# Patient Record
Sex: MALE | Race: WHITE | NOT HISPANIC OR LATINO | Employment: OTHER | URBAN - METROPOLITAN AREA
[De-identification: names, ages, dates, MRNs, and addresses within clinical notes are randomized per-mention and may not be internally consistent; named-entity substitution may affect disease eponyms.]

---

## 2017-05-10 ENCOUNTER — OFFICE VISIT (OUTPATIENT)
Dept: ORTHOPEDIC SURGERY | Facility: CLINIC | Age: 57
End: 2017-05-10

## 2017-05-10 VITALS
BODY MASS INDEX: 29.18 KG/M2 | DIASTOLIC BLOOD PRESSURE: 88 MMHG | HEIGHT: 69 IN | HEART RATE: 85 BPM | WEIGHT: 197 LBS | SYSTOLIC BLOOD PRESSURE: 136 MMHG

## 2017-05-10 DIAGNOSIS — S82.261D CLOSED DISPLACED SEGMENTAL FRACTURE OF SHAFT OF RIGHT TIBIA WITH ROUTINE HEALING, SUBSEQUENT ENCOUNTER: ICD-10-CM

## 2017-05-10 DIAGNOSIS — M75.102 TEAR OF LEFT ROTATOR CUFF, UNSPECIFIED TEAR EXTENT: ICD-10-CM

## 2017-05-10 DIAGNOSIS — Z09 STATUS POST ORTHOPEDIC SURGERY, FOLLOW-UP EXAM: ICD-10-CM

## 2017-05-10 DIAGNOSIS — R52 PAIN: Primary | ICD-10-CM

## 2017-05-10 PROCEDURE — 73590 X-RAY EXAM OF LOWER LEG: CPT | Performed by: ORTHOPAEDIC SURGERY

## 2017-05-10 PROCEDURE — 73030 X-RAY EXAM OF SHOULDER: CPT | Performed by: ORTHOPAEDIC SURGERY

## 2017-05-10 PROCEDURE — 99214 OFFICE O/P EST MOD 30 MIN: CPT | Performed by: ORTHOPAEDIC SURGERY

## 2018-10-29 ENCOUNTER — APPOINTMENT (OUTPATIENT)
Dept: ULTRASOUND IMAGING | Facility: HOSPITAL | Age: 58
End: 2018-10-29

## 2018-10-29 ENCOUNTER — HOSPITAL ENCOUNTER (EMERGENCY)
Facility: HOSPITAL | Age: 58
Discharge: HOME | End: 2018-10-29
Attending: EMERGENCY MEDICINE | Admitting: EMERGENCY MEDICINE

## 2018-10-29 ENCOUNTER — APPOINTMENT (OUTPATIENT)
Dept: CT IMAGING | Facility: HOSPITAL | Age: 58
End: 2018-10-29
Attending: EMERGENCY MEDICINE

## 2018-10-29 VITALS
HEART RATE: 80 BPM | SYSTOLIC BLOOD PRESSURE: 127 MMHG | DIASTOLIC BLOOD PRESSURE: 92 MMHG | HEIGHT: 69 IN | OXYGEN SATURATION: 97 % | RESPIRATION RATE: 18 BRPM | WEIGHT: 202 LBS | TEMPERATURE: 98 F | BODY MASS INDEX: 29.92 KG/M2

## 2018-10-29 DIAGNOSIS — N20.0 KIDNEY STONE ON LEFT SIDE: Primary | ICD-10-CM

## 2018-10-29 DIAGNOSIS — N50.3 EPIDIDYMAL CYST: ICD-10-CM

## 2018-10-29 DIAGNOSIS — E87.6 HYPOKALEMIA: ICD-10-CM

## 2018-10-29 LAB
ALBUMIN SERPL-MCNC: 4.1 G/DL (ref 3.5–5.2)
ALBUMIN/GLOB SERPL: 1.5 G/DL
ALP SERPL-CCNC: 73 U/L (ref 40–129)
ALT SERPL W P-5'-P-CCNC: 15 U/L (ref 5–41)
AMORPH URATE CRY URNS QL MICRO: ABNORMAL /HPF
ANION GAP SERPL CALCULATED.3IONS-SCNC: 15.3 MMOL/L
AST SERPL-CCNC: 15 U/L (ref 5–40)
BACTERIA UR QL AUTO: ABNORMAL /HPF
BASOPHILS # BLD AUTO: 0.05 10*3/MM3 (ref 0–0.2)
BASOPHILS NFR BLD AUTO: 0.9 % (ref 0–2)
BILIRUB SERPL-MCNC: 0.3 MG/DL (ref 0.2–1.2)
BILIRUB UR QL STRIP: NEGATIVE
BUN BLD-MCNC: 12 MG/DL (ref 6–20)
BUN/CREAT SERPL: 12.4 (ref 7–25)
CALCIUM SPEC-SCNC: 8.8 MG/DL (ref 8.6–10.5)
CHLORIDE SERPL-SCNC: 103 MMOL/L (ref 98–107)
CLARITY UR: ABNORMAL
CO2 SERPL-SCNC: 22.7 MMOL/L (ref 22–29)
COLOR UR: YELLOW
CREAT BLD-MCNC: 0.97 MG/DL (ref 0.76–1.27)
DEPRECATED RDW RBC AUTO: 39.8 FL (ref 37–54)
EOSINOPHIL # BLD AUTO: 0.11 10*3/MM3 (ref 0.1–0.3)
EOSINOPHIL NFR BLD AUTO: 1.9 % (ref 0–4)
ERYTHROCYTE [DISTWIDTH] IN BLOOD BY AUTOMATED COUNT: 12.1 % (ref 11.5–14.5)
GFR SERPL CREATININE-BSD FRML MDRD: 80 ML/MIN/1.73
GLOBULIN UR ELPH-MCNC: 2.8 GM/DL
GLUCOSE BLD-MCNC: 124 MG/DL (ref 65–99)
GLUCOSE UR STRIP-MCNC: NEGATIVE MG/DL
HCT VFR BLD AUTO: 45.5 % (ref 42–52)
HGB BLD-MCNC: 15.7 G/DL (ref 14–18)
HGB UR QL STRIP.AUTO: ABNORMAL
HOLD SPECIMEN: NORMAL
HOLD SPECIMEN: NORMAL
HYALINE CASTS UR QL AUTO: ABNORMAL /LPF
IMM GRANULOCYTES # BLD: 0.01 10*3/MM3 (ref 0–0.03)
IMM GRANULOCYTES NFR BLD: 0.2 % (ref 0–0.5)
KETONES UR QL STRIP: NEGATIVE
LEUKOCYTE ESTERASE UR QL STRIP.AUTO: NEGATIVE
LIPASE SERPL-CCNC: 39 U/L (ref 13–60)
LYMPHOCYTES # BLD AUTO: 2.04 10*3/MM3 (ref 0.6–4.8)
LYMPHOCYTES NFR BLD AUTO: 35.2 % (ref 20–45)
MCH RBC QN AUTO: 31.4 PG (ref 27–31)
MCHC RBC AUTO-ENTMCNC: 34.5 G/DL (ref 31–37)
MCV RBC AUTO: 91 FL (ref 80–94)
MONOCYTES # BLD AUTO: 0.54 10*3/MM3 (ref 0–1)
MONOCYTES NFR BLD AUTO: 9.3 % (ref 3–8)
NEUTROPHILS # BLD AUTO: 3.04 10*3/MM3 (ref 1.5–8.3)
NEUTROPHILS NFR BLD AUTO: 52.5 % (ref 45–70)
NITRITE UR QL STRIP: NEGATIVE
NRBC BLD MANUAL-RTO: 0 /100 WBC (ref 0–0)
PH UR STRIP.AUTO: >=9 [PH] (ref 4.5–8)
PLATELET # BLD AUTO: 166 10*3/MM3 (ref 140–500)
PMV BLD AUTO: 10.6 FL (ref 7.4–10.4)
POTASSIUM BLD-SCNC: 3.2 MMOL/L (ref 3.5–5.2)
PROT SERPL-MCNC: 6.9 G/DL (ref 6–8.5)
PROT UR QL STRIP: NEGATIVE
RBC # BLD AUTO: 5 10*6/MM3 (ref 4.7–6.1)
RBC # UR: ABNORMAL /HPF
REF LAB TEST METHOD: ABNORMAL
SODIUM BLD-SCNC: 141 MMOL/L (ref 136–145)
SP GR UR STRIP: 1.01 (ref 1–1.03)
SQUAMOUS #/AREA URNS HPF: ABNORMAL /HPF
UROBILINOGEN UR QL STRIP: ABNORMAL
WBC NRBC COR # BLD: 5.79 10*3/MM3 (ref 4.8–10.8)
WBC UR QL AUTO: ABNORMAL /HPF
WHOLE BLOOD HOLD SPECIMEN: NORMAL
WHOLE BLOOD HOLD SPECIMEN: NORMAL

## 2018-10-29 PROCEDURE — 93976 VASCULAR STUDY: CPT

## 2018-10-29 PROCEDURE — 99284 EMERGENCY DEPT VISIT MOD MDM: CPT

## 2018-10-29 PROCEDURE — 76870 US EXAM SCROTUM: CPT

## 2018-10-29 PROCEDURE — 96374 THER/PROPH/DIAG INJ IV PUSH: CPT

## 2018-10-29 PROCEDURE — 25010000002 ONDANSETRON PER 1 MG: Performed by: EMERGENCY MEDICINE

## 2018-10-29 PROCEDURE — 25010000002 KETOROLAC TROMETHAMINE PER 15 MG: Performed by: EMERGENCY MEDICINE

## 2018-10-29 PROCEDURE — 36415 COLL VENOUS BLD VENIPUNCTURE: CPT

## 2018-10-29 PROCEDURE — 81001 URINALYSIS AUTO W/SCOPE: CPT | Performed by: EMERGENCY MEDICINE

## 2018-10-29 PROCEDURE — 83690 ASSAY OF LIPASE: CPT | Performed by: EMERGENCY MEDICINE

## 2018-10-29 PROCEDURE — 85025 COMPLETE CBC W/AUTO DIFF WBC: CPT | Performed by: EMERGENCY MEDICINE

## 2018-10-29 PROCEDURE — 99284 EMERGENCY DEPT VISIT MOD MDM: CPT | Performed by: EMERGENCY MEDICINE

## 2018-10-29 PROCEDURE — 25010000002 MORPHINE PER 10 MG: Performed by: EMERGENCY MEDICINE

## 2018-10-29 PROCEDURE — 74176 CT ABD & PELVIS W/O CONTRAST: CPT

## 2018-10-29 PROCEDURE — 96375 TX/PRO/DX INJ NEW DRUG ADDON: CPT

## 2018-10-29 PROCEDURE — 80053 COMPREHEN METABOLIC PANEL: CPT | Performed by: EMERGENCY MEDICINE

## 2018-10-29 RX ORDER — OXYCODONE HYDROCHLORIDE AND ACETAMINOPHEN 5; 325 MG/1; MG/1
1 TABLET ORAL EVERY 6 HOURS PRN
Qty: 20 TABLET | Refills: 0 | OUTPATIENT
Start: 2018-10-29 | End: 2019-01-17

## 2018-10-29 RX ORDER — LOSARTAN POTASSIUM AND HYDROCHLOROTHIAZIDE 12.5; 5 MG/1; MG/1
1 TABLET ORAL DAILY
COMMUNITY

## 2018-10-29 RX ORDER — ONDANSETRON 4 MG/1
4 TABLET, ORALLY DISINTEGRATING ORAL EVERY 6 HOURS PRN
Qty: 20 TABLET | Refills: 0 | OUTPATIENT
Start: 2018-10-29 | End: 2019-01-17

## 2018-10-29 RX ORDER — IBUPROFEN 200 MG
200 TABLET ORAL EVERY 6 HOURS PRN
COMMUNITY
End: 2019-01-17

## 2018-10-29 RX ORDER — KETOROLAC TROMETHAMINE 30 MG/ML
30 INJECTION, SOLUTION INTRAMUSCULAR; INTRAVENOUS ONCE
Status: COMPLETED | OUTPATIENT
Start: 2018-10-29 | End: 2018-10-29

## 2018-10-29 RX ORDER — SERTRALINE HYDROCHLORIDE 100 MG/1
100 TABLET, FILM COATED ORAL DAILY
COMMUNITY
End: 2019-01-17

## 2018-10-29 RX ORDER — OMEPRAZOLE 20 MG/1
20 CAPSULE, DELAYED RELEASE ORAL DAILY
COMMUNITY
End: 2019-01-17

## 2018-10-29 RX ORDER — SODIUM CHLORIDE 0.9 % (FLUSH) 0.9 %
10 SYRINGE (ML) INJECTION AS NEEDED
Status: DISCONTINUED | OUTPATIENT
Start: 2018-10-29 | End: 2018-10-29 | Stop reason: HOSPADM

## 2018-10-29 RX ORDER — CARBAMAZEPINE 200 MG/1
200 TABLET ORAL 2 TIMES DAILY
COMMUNITY

## 2018-10-29 RX ORDER — ONDANSETRON 2 MG/ML
4 INJECTION INTRAMUSCULAR; INTRAVENOUS ONCE
Status: COMPLETED | OUTPATIENT
Start: 2018-10-29 | End: 2018-10-29

## 2018-10-29 RX ORDER — POTASSIUM CHLORIDE 20 MEQ/1
20 TABLET, EXTENDED RELEASE ORAL DAILY
Qty: 7 TABLET | Refills: 0 | Status: SHIPPED | OUTPATIENT
Start: 2018-10-29 | End: 2018-11-05

## 2018-10-29 RX ADMIN — Medication 10 ML: at 11:20

## 2018-10-29 RX ADMIN — MORPHINE SULFATE 4 MG: 4 INJECTION INTRAVENOUS at 11:48

## 2018-10-29 RX ADMIN — KETOROLAC TROMETHAMINE 30 MG: 30 INJECTION, SOLUTION INTRAMUSCULAR at 11:48

## 2018-10-29 RX ADMIN — ONDANSETRON 4 MG: 2 INJECTION, SOLUTION INTRAMUSCULAR; INTRAVENOUS at 11:48

## 2019-01-17 ENCOUNTER — HOSPITAL ENCOUNTER (EMERGENCY)
Facility: HOSPITAL | Age: 59
Discharge: HOME | End: 2019-01-17
Attending: EMERGENCY MEDICINE | Admitting: EMERGENCY MEDICINE

## 2019-01-17 ENCOUNTER — APPOINTMENT (OUTPATIENT)
Dept: CT IMAGING | Facility: HOSPITAL | Age: 59
End: 2019-01-17

## 2019-01-17 VITALS
BODY MASS INDEX: 29.33 KG/M2 | SYSTOLIC BLOOD PRESSURE: 125 MMHG | HEIGHT: 69 IN | RESPIRATION RATE: 16 BRPM | OXYGEN SATURATION: 94 % | DIASTOLIC BLOOD PRESSURE: 96 MMHG | TEMPERATURE: 97 F | HEART RATE: 71 BPM | WEIGHT: 198 LBS

## 2019-01-17 DIAGNOSIS — R10.9 ACUTE RIGHT FLANK PAIN: Primary | ICD-10-CM

## 2019-01-17 DIAGNOSIS — N20.1 RIGHT URETERAL STONE: ICD-10-CM

## 2019-01-17 LAB
ALBUMIN SERPL-MCNC: 4.5 G/DL (ref 3.5–5.2)
ALBUMIN/GLOB SERPL: 1.3 G/DL
ALP SERPL-CCNC: 76 U/L (ref 40–129)
ALT SERPL W P-5'-P-CCNC: 13 U/L (ref 5–41)
ANION GAP SERPL CALCULATED.3IONS-SCNC: 13.1 MMOL/L
AST SERPL-CCNC: 15 U/L (ref 5–40)
BACTERIA UR QL AUTO: ABNORMAL /HPF
BASOPHILS # BLD AUTO: 0.08 10*3/MM3 (ref 0–0.2)
BASOPHILS NFR BLD AUTO: 1 % (ref 0–2)
BILIRUB SERPL-MCNC: 0.4 MG/DL (ref 0.2–1.2)
BILIRUB UR QL STRIP: NEGATIVE
BUN BLD-MCNC: 19 MG/DL (ref 6–20)
BUN/CREAT SERPL: 19.2 (ref 7–25)
CALCIUM SPEC-SCNC: 9.3 MG/DL (ref 8.6–10.5)
CHLORIDE SERPL-SCNC: 105 MMOL/L (ref 98–107)
CLARITY UR: CLEAR
CO2 SERPL-SCNC: 22.9 MMOL/L (ref 22–29)
COLOR UR: YELLOW
CREAT BLD-MCNC: 0.99 MG/DL (ref 0.76–1.27)
DEPRECATED RDW RBC AUTO: 41.4 FL (ref 37–54)
EOSINOPHIL # BLD AUTO: 0.13 10*3/MM3 (ref 0.1–0.3)
EOSINOPHIL NFR BLD AUTO: 1.6 % (ref 0–4)
ERYTHROCYTE [DISTWIDTH] IN BLOOD BY AUTOMATED COUNT: 12.4 % (ref 11.5–14.5)
GFR SERPL CREATININE-BSD FRML MDRD: 78 ML/MIN/1.73
GLOBULIN UR ELPH-MCNC: 3.4 GM/DL
GLUCOSE BLD-MCNC: 136 MG/DL (ref 65–99)
GLUCOSE UR STRIP-MCNC: NEGATIVE MG/DL
HCT VFR BLD AUTO: 45.1 % (ref 42–52)
HGB BLD-MCNC: 15.7 G/DL (ref 14–18)
HGB UR QL STRIP.AUTO: ABNORMAL
HYALINE CASTS UR QL AUTO: ABNORMAL /LPF
IMM GRANULOCYTES # BLD AUTO: 0.02 10*3/MM3 (ref 0–0.03)
IMM GRANULOCYTES NFR BLD AUTO: 0.2 % (ref 0–0.5)
KETONES UR QL STRIP: NEGATIVE
LEUKOCYTE ESTERASE UR QL STRIP.AUTO: NEGATIVE
LYMPHOCYTES # BLD AUTO: 2.84 10*3/MM3 (ref 0.6–4.8)
LYMPHOCYTES NFR BLD AUTO: 33.9 % (ref 20–45)
MCH RBC QN AUTO: 31.8 PG (ref 27–31)
MCHC RBC AUTO-ENTMCNC: 34.8 G/DL (ref 31–37)
MCV RBC AUTO: 91.5 FL (ref 80–94)
MONOCYTES # BLD AUTO: 0.85 10*3/MM3 (ref 0–1)
MONOCYTES NFR BLD AUTO: 10.2 % (ref 3–8)
NEUTROPHILS # BLD AUTO: 4.45 10*3/MM3 (ref 1.5–8.3)
NEUTROPHILS NFR BLD AUTO: 53.1 % (ref 45–70)
NITRITE UR QL STRIP: NEGATIVE
NRBC BLD AUTO-RTO: 0 /100 WBC (ref 0–0)
PH UR STRIP.AUTO: 7.5 [PH] (ref 4.5–8)
PLATELET # BLD AUTO: 176 10*3/MM3 (ref 140–500)
PMV BLD AUTO: 11.5 FL (ref 7.4–10.4)
POTASSIUM BLD-SCNC: 3.2 MMOL/L (ref 3.5–5.2)
PROT SERPL-MCNC: 7.9 G/DL (ref 6–8.5)
PROT UR QL STRIP: NEGATIVE
RBC # BLD AUTO: 4.93 10*6/MM3 (ref 4.7–6.1)
RBC # UR: ABNORMAL /HPF
REF LAB TEST METHOD: ABNORMAL
SODIUM BLD-SCNC: 141 MMOL/L (ref 136–145)
SP GR UR STRIP: 1.02 (ref 1–1.03)
SQUAMOUS #/AREA URNS HPF: ABNORMAL /HPF
UROBILINOGEN UR QL STRIP: ABNORMAL
WBC NRBC COR # BLD: 8.37 10*3/MM3 (ref 4.8–10.8)
WBC UR QL AUTO: ABNORMAL /HPF

## 2019-01-17 PROCEDURE — 25010000002 ONDANSETRON PER 1 MG: Performed by: EMERGENCY MEDICINE

## 2019-01-17 PROCEDURE — 81001 URINALYSIS AUTO W/SCOPE: CPT | Performed by: EMERGENCY MEDICINE

## 2019-01-17 PROCEDURE — 85025 COMPLETE CBC W/AUTO DIFF WBC: CPT | Performed by: EMERGENCY MEDICINE

## 2019-01-17 PROCEDURE — 80053 COMPREHEN METABOLIC PANEL: CPT | Performed by: EMERGENCY MEDICINE

## 2019-01-17 PROCEDURE — 25010000002 HYDROMORPHONE PER 4 MG: Performed by: EMERGENCY MEDICINE

## 2019-01-17 PROCEDURE — 99284 EMERGENCY DEPT VISIT MOD MDM: CPT | Performed by: EMERGENCY MEDICINE

## 2019-01-17 PROCEDURE — 96375 TX/PRO/DX INJ NEW DRUG ADDON: CPT

## 2019-01-17 PROCEDURE — 96374 THER/PROPH/DIAG INJ IV PUSH: CPT

## 2019-01-17 PROCEDURE — 96361 HYDRATE IV INFUSION ADD-ON: CPT

## 2019-01-17 PROCEDURE — 74176 CT ABD & PELVIS W/O CONTRAST: CPT

## 2019-01-17 PROCEDURE — 25010000002 KETOROLAC TROMETHAMINE PER 15 MG: Performed by: EMERGENCY MEDICINE

## 2019-01-17 PROCEDURE — 99284 EMERGENCY DEPT VISIT MOD MDM: CPT

## 2019-01-17 RX ORDER — HYDROMORPHONE HCL 110MG/55ML
0.5 PATIENT CONTROLLED ANALGESIA SYRINGE INTRAVENOUS ONCE
Status: COMPLETED | OUTPATIENT
Start: 2019-01-17 | End: 2019-01-17

## 2019-01-17 RX ORDER — TAMSULOSIN HYDROCHLORIDE 0.4 MG/1
0.4 CAPSULE ORAL ONCE
Status: COMPLETED | OUTPATIENT
Start: 2019-01-17 | End: 2019-01-17

## 2019-01-17 RX ORDER — HYDROCODONE BITARTRATE AND ACETAMINOPHEN 5; 325 MG/1; MG/1
1 TABLET ORAL EVERY 8 HOURS PRN
Qty: 15 TABLET | Refills: 0 | Status: SHIPPED | OUTPATIENT
Start: 2019-01-17 | End: 2019-01-22

## 2019-01-17 RX ORDER — SODIUM CHLORIDE 9 MG/ML
INJECTION, SOLUTION INTRAVENOUS
Status: DISCONTINUED
Start: 2019-01-17 | End: 2019-01-17 | Stop reason: HOSPADM

## 2019-01-17 RX ORDER — SODIUM CHLORIDE 0.9 % (FLUSH) 0.9 %
10 SYRINGE (ML) INJECTION AS NEEDED
Status: DISCONTINUED | OUTPATIENT
Start: 2019-01-17 | End: 2019-01-17 | Stop reason: HOSPADM

## 2019-01-17 RX ORDER — ONDANSETRON 2 MG/ML
8 INJECTION INTRAMUSCULAR; INTRAVENOUS ONCE
Status: COMPLETED | OUTPATIENT
Start: 2019-01-17 | End: 2019-01-17

## 2019-01-17 RX ORDER — TAMSULOSIN HYDROCHLORIDE 0.4 MG/1
1 CAPSULE ORAL DAILY
Qty: 7 CAPSULE | Refills: 0 | Status: SHIPPED | OUTPATIENT
Start: 2019-01-17 | End: 2019-01-24

## 2019-01-17 RX ORDER — KETOROLAC TROMETHAMINE 30 MG/ML
30 INJECTION, SOLUTION INTRAMUSCULAR; INTRAVENOUS ONCE
Status: COMPLETED | OUTPATIENT
Start: 2019-01-17 | End: 2019-01-17

## 2019-01-17 RX ORDER — ONDANSETRON 4 MG/1
4 TABLET, FILM COATED ORAL EVERY 8 HOURS PRN
Qty: 20 TABLET | Refills: 0 | Status: SHIPPED | OUTPATIENT
Start: 2019-01-17 | End: 2019-01-24

## 2019-01-17 RX ADMIN — SODIUM CHLORIDE 500 ML: 9 INJECTION, SOLUTION INTRAVENOUS at 07:18

## 2019-01-17 RX ADMIN — KETOROLAC TROMETHAMINE 30 MG: 30 INJECTION, SOLUTION INTRAMUSCULAR; INTRAVENOUS at 07:20

## 2019-01-17 RX ADMIN — TAMSULOSIN HYDROCHLORIDE 0.4 MG: 0.4 CAPSULE ORAL at 07:24

## 2019-01-17 RX ADMIN — HYDROMORPHONE HYDROCHLORIDE 0.5 MG: 2 INJECTION INTRAMUSCULAR; INTRAVENOUS; SUBCUTANEOUS at 07:23

## 2019-01-17 RX ADMIN — ONDANSETRON 8 MG: 2 INJECTION, SOLUTION INTRAMUSCULAR; INTRAVENOUS at 07:18

## 2019-01-17 NOTE — DISCHARGE INSTRUCTIONS
Drink plenty of water as tolerated.  Must follow-up with the urology doctor as we discussed.  Please return to the emergency room for any worsening pain, fevers, nausea, vomiting, difficulties urinating or any other concerns.

## 2019-01-17 NOTE — ED PROVIDER NOTES
Subjective   Pleasant 58-year-old male with history of ureterolithiasis presents to the emergency department with right flank pain radiating around into his groin that started about 2 AM this morning.  It is accompanied by nausea and chills.  No vomiting.  No fever.  No shortness of breath.  He has not noticed hematuria.  He went to bed last night feeling fine and has not noticed any recent symptoms.  Last kidney stone was in October of last year.        Flank Pain   Pain location:  R flank  Pain quality: bloating, burning, stabbing and throbbing    Pain quality: no stiffness    Pain radiates to:  R flank and scrotum  Pain severity:  Severe  Onset quality:  Sudden  Duration:  5 hours  Timing:  Constant  Progression:  Worsening  Chronicity:  Recurrent  Context: not alcohol use, not diet changes, not eating, not medication withdrawal, not recent illness, not retching, not sick contacts and not suspicious food intake    Relieved by:  Nothing  Worsened by:  Nothing  Ineffective treatments:  None tried  Associated symptoms: nausea    Associated symptoms: no belching, no chest pain, no chills, no constipation, no dysuria, no fatigue, no fever, no hematemesis, no hematochezia, no hematuria, no melena, no shortness of breath and no sore throat    Risk factors: no alcohol abuse, no aspirin use, no NSAID use and no recent hospitalization        Review of Systems   Constitutional: Negative for chills, fatigue and fever.   HENT: Negative for sore throat.    Respiratory: Negative for chest tightness and shortness of breath.    Cardiovascular: Negative for chest pain.   Gastrointestinal: Positive for nausea. Negative for abdominal pain, constipation, hematemesis, hematochezia and melena.   Genitourinary: Positive for flank pain and frequency. Negative for dysuria and hematuria.   Skin: Negative.  Negative for wound.   Neurological: Negative for headaches.   Psychiatric/Behavioral: Negative.    All other systems reviewed and are  negative.      Past Medical History:   Diagnosis Date   • Epilepsy (CMS/HCC)    • GERD (gastroesophageal reflux disease)    • Hypertension    • Kidney stone        Allergies   Allergen Reactions   • Amoxicillin Rash       Past Surgical History:   Procedure Laterality Date   • ANKLE SURGERY  2010   • APPENDECTOMY     • ELBOW PROCEDURE         Family History   Problem Relation Age of Onset   • Hypertension Mother    • Hypertension Father        Social History     Socioeconomic History   • Marital status: Single     Spouse name: Not on file   • Number of children: Not on file   • Years of education: Not on file   • Highest education level: Not on file   Tobacco Use   • Smoking status: Never Smoker   • Smokeless tobacco: Never Used   Substance and Sexual Activity   • Alcohol use: No   • Drug use: No   • Sexual activity: Defer           Objective   Physical Exam   Constitutional: He is oriented to person, place, and time. Vital signs are normal. He appears well-developed and well-nourished. No distress.   HENT:   Head: Normocephalic and atraumatic.   Eyes: Conjunctivae and EOM are normal.   Neck: Neck supple.   Cardiovascular: Normal rate, normal heart sounds and normal pulses. An irregular rhythm present.   Pulmonary/Chest: Effort normal and breath sounds normal. No tachypnea and no bradypnea. He has no wheezes. He has no rhonchi. He has no rales.   Abdominal: Soft. Normal appearance and bowel sounds are normal. He exhibits no distension, no pulsatile liver and no pulsatile midline mass. There is tenderness in the right lower quadrant. There is no rebound, no guarding, no tenderness at McBurney's point and negative Shultz's sign.   Generalized right flank tenderness   Neurological: He is alert and oriented to person, place, and time. No cranial nerve deficit or sensory deficit. GCS eye subscore is 4. GCS verbal subscore is 5. GCS motor subscore is 6.   Skin: Skin is warm and dry. He is not diaphoretic.   Psychiatric: He  has a normal mood and affect. His speech is normal and behavior is normal. Judgment normal. Cognition and memory are normal.   Nursing note and vitals reviewed.      Procedures           ED Course  ED Course as of Jan 17 0713   Thu Jan 17, 2019   0712 Hematuria noted.  Labs otherwise reassuring.  CMP pending.  Treating pain aggressively and ordering CT.  [JF]      ED Course User Index  [JF] Rich Hendrickson MD                  MDM  Number of Diagnoses or Management Options  Diagnosis management comments: My differential diagnosis for abdominal pain includes but is not limited to:  Gastritis, gastroenteritis, peptic ulcer disease, GERD, esophageal perforation, acute appendicitis, mesenteric adenitis, Meckel’s diverticulum, epiploic appendagitis, diverticulitis, colon cancer, ulcerative colitis, Crohn’s disease, intussusception, small bowel obstruction, adhesions, ischemic bowel, perforated viscus, ileus, obstipation, biliary colic, cholecystitis, cholelithiasis, hepatitis, pancreatitis, common bile duct obstruction, cholangitis, bile leak, splenic trauma, splenic rupture, splenic infarction, splenic abscess, abdominal abscess, ascites, spontaneous bacterial peritonitis, hernia, UTI, cystitis, prostatitis, ureterolithiasis, urinary obstruction, AAA, myocardial infarction, pneumonia, cancer, porphyria, DKA, medications, sickle cell, viral syndrome, zoster       Amount and/or Complexity of Data Reviewed  Clinical lab tests: ordered and reviewed  Tests in the radiology section of CPT®: ordered and reviewed  Review and summarize past medical records: yes (History of prior stones, most recently in October up to 5 mm in diameter)  Discuss the patient with other providers: (Patient handed off to Dr. Boyle pending CT scan and reevaluation.)    Risk of Complications, Morbidity, and/or Mortality  Presenting problems: high  Diagnostic procedures: high  Management options: high    Patient Progress  Patient progress:  stable        Final diagnoses:   Acute right flank pain            Rich Hendrickson MD  01/17/19 3241

## 2019-01-17 NOTE — ED PROVIDER NOTES
I assumed care of this patient from Dr. Hendrickson at the time of shift change, 7:30 AM, this morning.  CT scan results pending at that time.    I have reviewed the labs from today's visit.    Results for orders placed or performed during the hospital encounter of 01/17/19   Urinalysis With Microscopic If Indicated (No Culture) - Urine, Clean Catch   Result Value Ref Range    Color, UA Yellow Yellow, Straw    Appearance, UA Clear Clear    pH, UA 7.5 4.5 - 8.0    Specific Gravity, UA 1.020 1.003 - 1.030    Glucose, UA Negative Negative    Ketones, UA Negative Negative, 80 mg/dL (3+), >=160 mg/dL (4+)    Bilirubin, UA Negative Negative    Blood, UA Large (3+) (A) Negative    Protein, UA Negative Negative    Leuk Esterase, UA Negative Negative    Nitrite, UA Negative Negative    Urobilinogen, UA 0.2 E.U./dL 0.2 - 1.0 E.U./dL   Comprehensive Metabolic Panel   Result Value Ref Range    Glucose 136 (H) 65 - 99 mg/dL    BUN 19 6 - 20 mg/dL    Creatinine 0.99 0.76 - 1.27 mg/dL    Sodium 141 136 - 145 mmol/L    Potassium 3.2 (L) 3.5 - 5.2 mmol/L    Chloride 105 98 - 107 mmol/L    CO2 22.9 22.0 - 29.0 mmol/L    Calcium 9.3 8.6 - 10.5 mg/dL    Total Protein 7.9 6.0 - 8.5 g/dL    Albumin 4.50 3.50 - 5.20 g/dL    ALT (SGPT) 13 5 - 41 U/L    AST (SGOT) 15 5 - 40 U/L    Alkaline Phosphatase 76 40 - 129 U/L    Total Bilirubin 0.4 0.2 - 1.2 mg/dL    eGFR Non African Amer 78 >60 mL/min/1.73    Globulin 3.4 gm/dL    A/G Ratio 1.3 g/dL    BUN/Creatinine Ratio 19.2 7.0 - 25.0    Anion Gap 13.1 mmol/L   CBC Auto Differential   Result Value Ref Range    WBC 8.37 4.80 - 10.80 10*3/mm3    RBC 4.93 4.70 - 6.10 10*6/mm3    Hemoglobin 15.7 14.0 - 18.0 g/dL    Hematocrit 45.1 42.0 - 52.0 %    MCV 91.5 80.0 - 94.0 fL    MCH 31.8 (H) 27.0 - 31.0 pg    MCHC 34.8 31.0 - 37.0 g/dL    RDW 12.4 11.5 - 14.5 %    RDW-SD 41.4 37.0 - 54.0 fl    MPV 11.5 (H) 7.4 - 10.4 fL    Platelets 176 140 - 500 10*3/mm3    Neutrophil % 53.1 45.0 - 70.0 %    Lymphocyte %  "33.9 20.0 - 45.0 %    Monocyte % 10.2 (H) 3.0 - 8.0 %    Eosinophil % 1.6 0.0 - 4.0 %    Basophil % 1.0 0.0 - 2.0 %    Immature Grans % 0.2 0.0 - 0.5 %    Neutrophils, Absolute 4.45 1.50 - 8.30 10*3/mm3    Lymphocytes, Absolute 2.84 0.60 - 4.80 10*3/mm3    Monocytes, Absolute 0.85 0.00 - 1.00 10*3/mm3    Eosinophils, Absolute 0.13 0.10 - 0.30 10*3/mm3    Basophils, Absolute 0.08 0.00 - 0.20 10*3/mm3    Immature Grans, Absolute 0.02 0.00 - 0.03 10*3/mm3    nRBC 0.0 0.0 - 0.0 /100 WBC   Urinalysis, Microscopic Only - Urine, Clean Catch   Result Value Ref Range    RBC, UA 21-30 (A) None Seen /HPF    WBC, UA 3-5 (A) None Seen /HPF    Bacteria, UA None Seen None Seen /HPF    Squamous Epithelial Cells, UA None Seen None Seen, 0-2 /HPF    Hyaline Casts, UA 0-2 None Seen /LPF    Methodology Manual Light Microscopy          I have reviewed the CT scan from this visit as well.    RADIOLOGY        Study: CT abdomen and pelvis without contrast    Findings: IMPRESSION:   1. Obstructing right distal ureter calculus measuring up to 9 mm  positioned in the distal ureter above the UVJ causing mild right  hydronephrosis.  2. Bilateral medullary nephrocalcinosis. Presumed tiny bilateral renal  cysts.  3. Mild lower colonic diverticulosis. Appendectomy.    Interpreted contemporaneously with treatment by Dr. Tamayo, independently viewed by me        I informed the pt of the findings from his CT scan.  He was feeling much better after IV meds here.  Essentially pain free.  I advised him that he would need to call the urology office today to schedule an appt for tomorrow with Dr. Echeverria or Dr. Hdz - explained to him that he would very likely need to have Lithotripsy for this stone based on the size.  D/c'd home on Norco, flomax and zofran.  Reviewed with him the usual \"return to ER\" instructions prior to D/c.      Dx:  Right Ureteral Stone  Flank pain                 Joss Boyle MD  01/17/19 0833    "

## 2020-03-05 ENCOUNTER — HOSPITAL ENCOUNTER (EMERGENCY)
Facility: HOSPITAL | Age: 60
Discharge: HOME | End: 2020-03-05
Attending: EMERGENCY MEDICINE | Admitting: EMERGENCY MEDICINE

## 2020-03-05 ENCOUNTER — APPOINTMENT (OUTPATIENT)
Dept: GENERAL RADIOLOGY | Facility: HOSPITAL | Age: 60
End: 2020-03-05

## 2020-03-05 VITALS
OXYGEN SATURATION: 98 % | HEART RATE: 90 BPM | SYSTOLIC BLOOD PRESSURE: 138 MMHG | BODY MASS INDEX: 30.51 KG/M2 | DIASTOLIC BLOOD PRESSURE: 88 MMHG | RESPIRATION RATE: 18 BRPM | TEMPERATURE: 99.7 F | HEIGHT: 69 IN | WEIGHT: 206 LBS

## 2020-03-05 DIAGNOSIS — J10.1 INFLUENZA A: Primary | ICD-10-CM

## 2020-03-05 DIAGNOSIS — E87.6 HYPOKALEMIA: ICD-10-CM

## 2020-03-05 LAB
ALBUMIN SERPL-MCNC: 3.9 G/DL (ref 3.5–5.2)
ALBUMIN/GLOB SERPL: 0.9 G/DL
ALP SERPL-CCNC: 87 U/L (ref 39–117)
ALT SERPL W P-5'-P-CCNC: 25 U/L (ref 1–41)
ANION GAP SERPL CALCULATED.3IONS-SCNC: 13.7 MMOL/L (ref 5–15)
AST SERPL-CCNC: 38 U/L (ref 1–40)
BASOPHILS # BLD AUTO: 0.01 10*3/MM3 (ref 0–0.2)
BASOPHILS NFR BLD AUTO: 0.3 % (ref 0–1.5)
BILIRUB SERPL-MCNC: 0.3 MG/DL (ref 0.2–1.2)
BUN BLD-MCNC: 19 MG/DL (ref 6–20)
BUN/CREAT SERPL: 21.8 (ref 7–25)
CALCIUM SPEC-SCNC: 8.8 MG/DL (ref 8.6–10.5)
CHLORIDE SERPL-SCNC: 99 MMOL/L (ref 98–107)
CO2 SERPL-SCNC: 23.3 MMOL/L (ref 22–29)
CREAT BLD-MCNC: 0.87 MG/DL (ref 0.76–1.27)
DEPRECATED RDW RBC AUTO: 42.7 FL (ref 37–54)
EOSINOPHIL # BLD AUTO: 0 10*3/MM3 (ref 0–0.4)
EOSINOPHIL NFR BLD AUTO: 0 % (ref 0.3–6.2)
ERYTHROCYTE [DISTWIDTH] IN BLOOD BY AUTOMATED COUNT: 12.4 % (ref 12.3–15.4)
FLUAV AG NPH QL: POSITIVE
FLUBV AG NPH QL IA: NEGATIVE
GFR SERPL CREATININE-BSD FRML MDRD: 90 ML/MIN/1.73
GLOBULIN UR ELPH-MCNC: 4.2 GM/DL
GLUCOSE BLD-MCNC: 99 MG/DL (ref 65–99)
HCT VFR BLD AUTO: 47.9 % (ref 37.5–51)
HGB BLD-MCNC: 16.3 G/DL (ref 13–17.7)
IMM GRANULOCYTES # BLD AUTO: 0.01 10*3/MM3 (ref 0–0.05)
IMM GRANULOCYTES NFR BLD AUTO: 0.3 % (ref 0–0.5)
LIPASE SERPL-CCNC: 57 U/L (ref 13–60)
LYMPHOCYTES # BLD AUTO: 1.17 10*3/MM3 (ref 0.7–3.1)
LYMPHOCYTES NFR BLD AUTO: 30.1 % (ref 19.6–45.3)
MAGNESIUM SERPL-MCNC: 2.1 MG/DL (ref 1.6–2.6)
MCH RBC QN AUTO: 31.6 PG (ref 26.6–33)
MCHC RBC AUTO-ENTMCNC: 34 G/DL (ref 31.5–35.7)
MCV RBC AUTO: 92.8 FL (ref 79–97)
MONOCYTES # BLD AUTO: 0.42 10*3/MM3 (ref 0.1–0.9)
MONOCYTES NFR BLD AUTO: 10.8 % (ref 5–12)
NEUTROPHILS # BLD AUTO: 2.28 10*3/MM3 (ref 1.7–7)
NEUTROPHILS NFR BLD AUTO: 58.5 % (ref 42.7–76)
NRBC BLD AUTO-RTO: 0 /100 WBC (ref 0–0.2)
PLAT MORPH BLD: NORMAL
PLATELET # BLD AUTO: 107 10*3/MM3 (ref 140–450)
PMV BLD AUTO: 11.2 FL (ref 6–12)
POTASSIUM BLD-SCNC: 3.3 MMOL/L (ref 3.5–5.2)
PROCALCITONIN SERPL-MCNC: 0.12 NG/ML (ref 0.1–0.25)
PROT SERPL-MCNC: 8.1 G/DL (ref 6–8.5)
RBC # BLD AUTO: 5.16 10*6/MM3 (ref 4.14–5.8)
RBC MORPH BLD: NORMAL
SODIUM BLD-SCNC: 136 MMOL/L (ref 136–145)
WBC MORPH BLD: NORMAL
WBC NRBC COR # BLD: 3.89 10*3/MM3 (ref 3.4–10.8)

## 2020-03-05 PROCEDURE — 84145 PROCALCITONIN (PCT): CPT | Performed by: PHYSICIAN ASSISTANT

## 2020-03-05 PROCEDURE — 99284 EMERGENCY DEPT VISIT MOD MDM: CPT | Performed by: PHYSICIAN ASSISTANT

## 2020-03-05 PROCEDURE — 0100U HC BIOFIRE FILMARRAY RESP PANEL 2: CPT | Performed by: PHYSICIAN ASSISTANT

## 2020-03-05 PROCEDURE — 63710000001 PREDNISONE PER 1 MG: Performed by: PHYSICIAN ASSISTANT

## 2020-03-05 PROCEDURE — 80053 COMPREHEN METABOLIC PANEL: CPT | Performed by: PHYSICIAN ASSISTANT

## 2020-03-05 PROCEDURE — 99284 EMERGENCY DEPT VISIT MOD MDM: CPT

## 2020-03-05 PROCEDURE — 83690 ASSAY OF LIPASE: CPT | Performed by: PHYSICIAN ASSISTANT

## 2020-03-05 PROCEDURE — 71046 X-RAY EXAM CHEST 2 VIEWS: CPT

## 2020-03-05 PROCEDURE — 85007 BL SMEAR W/DIFF WBC COUNT: CPT | Performed by: PHYSICIAN ASSISTANT

## 2020-03-05 PROCEDURE — 87804 INFLUENZA ASSAY W/OPTIC: CPT | Performed by: PHYSICIAN ASSISTANT

## 2020-03-05 PROCEDURE — 85025 COMPLETE CBC W/AUTO DIFF WBC: CPT | Performed by: PHYSICIAN ASSISTANT

## 2020-03-05 PROCEDURE — 25010000002 ONDANSETRON PER 1 MG: Performed by: PHYSICIAN ASSISTANT

## 2020-03-05 PROCEDURE — 83735 ASSAY OF MAGNESIUM: CPT | Performed by: PHYSICIAN ASSISTANT

## 2020-03-05 PROCEDURE — 96374 THER/PROPH/DIAG INJ IV PUSH: CPT

## 2020-03-05 RX ORDER — BENZONATATE 100 MG/1
100 CAPSULE ORAL 3 TIMES DAILY PRN
Qty: 20 CAPSULE | Refills: 0 | Status: SHIPPED | OUTPATIENT
Start: 2020-03-05 | End: 2020-03-12

## 2020-03-05 RX ORDER — METHYLPREDNISOLONE 4 MG/1
TABLET ORAL
Qty: 21 TABLET | Refills: 0 | Status: SHIPPED | OUTPATIENT
Start: 2020-03-05

## 2020-03-05 RX ORDER — PREDNISONE 20 MG/1
60 TABLET ORAL ONCE
Status: COMPLETED | OUTPATIENT
Start: 2020-03-05 | End: 2020-03-05

## 2020-03-05 RX ORDER — ALBUTEROL SULFATE 90 UG/1
2 AEROSOL, METERED RESPIRATORY (INHALATION) EVERY 6 HOURS PRN
Qty: 1 INHALER | Refills: 0 | Status: SHIPPED | OUTPATIENT
Start: 2020-03-05

## 2020-03-05 RX ORDER — SODIUM CHLORIDE 0.9 % (FLUSH) 0.9 %
10 SYRINGE (ML) INJECTION AS NEEDED
Status: DISCONTINUED | OUTPATIENT
Start: 2020-03-05 | End: 2020-03-05 | Stop reason: HOSPADM

## 2020-03-05 RX ORDER — BENZONATATE 100 MG/1
100 CAPSULE ORAL ONCE
Status: COMPLETED | OUTPATIENT
Start: 2020-03-05 | End: 2020-03-05

## 2020-03-05 RX ORDER — ONDANSETRON 2 MG/ML
4 INJECTION INTRAMUSCULAR; INTRAVENOUS ONCE
Status: COMPLETED | OUTPATIENT
Start: 2020-03-05 | End: 2020-03-05

## 2020-03-05 RX ORDER — ONDANSETRON 4 MG/1
4 TABLET, ORALLY DISINTEGRATING ORAL 4 TIMES DAILY PRN
Qty: 12 TABLET | Refills: 0 | Status: SHIPPED | OUTPATIENT
Start: 2020-03-05

## 2020-03-05 RX ORDER — POTASSIUM CHLORIDE 20 MEQ/1
20 TABLET, EXTENDED RELEASE ORAL DAILY
Status: DISCONTINUED | OUTPATIENT
Start: 2020-03-05 | End: 2020-03-05 | Stop reason: HOSPADM

## 2020-03-05 RX ORDER — GUAIFENESIN 600 MG/1
600 TABLET, EXTENDED RELEASE ORAL ONCE
Status: COMPLETED | OUTPATIENT
Start: 2020-03-05 | End: 2020-03-05

## 2020-03-05 RX ADMIN — ONDANSETRON 4 MG: 2 INJECTION, SOLUTION INTRAMUSCULAR; INTRAVENOUS at 19:16

## 2020-03-05 RX ADMIN — GUAIFENESIN 600 MG: 600 TABLET, EXTENDED RELEASE ORAL at 19:16

## 2020-03-05 RX ADMIN — POTASSIUM CHLORIDE 20 MEQ: 1500 TABLET, EXTENDED RELEASE ORAL at 20:18

## 2020-03-05 RX ADMIN — SODIUM CHLORIDE 1000 ML: 9 INJECTION, SOLUTION INTRAVENOUS at 19:22

## 2020-03-05 RX ADMIN — PREDNISONE 60 MG: 20 TABLET ORAL at 20:18

## 2020-03-05 RX ADMIN — BENZONATATE 100 MG: 100 CAPSULE ORAL at 19:16

## 2020-03-05 NOTE — ED PROVIDER NOTES
Subjective   History of Present Illness  History of Present Illness    Chief complaint: cough    Location: generalized    Quality/Severity:  Productive with green sputum    Timing/Duration: 2 weeks, intermittent    Modifying Factors: improved after nyquil.  Nothing makes worse.     Associated Symptoms: Positive subjective fevers and chills.  Positive nausea, vomiting, diarrhea, watery/nonbloody 1-2 episodes per day for the 2 weeks.  Denies abdominal pain.  Denies chest pain or shortness of breath.  Denies headache.  Denies dizziness.    Narrative: 59-year-old male presents with cough and nausea, vomiting, and diarrhea as above.  He denies any sick contacts.   Denies travel.     Review of Systems   Constitutional: Positive for chills and fever.   HENT: Positive for congestion and rhinorrhea. Negative for sore throat.    Eyes: Negative.    Respiratory: Positive for cough. Negative for shortness of breath and wheezing.    Cardiovascular: Negative.  Negative for chest pain, palpitations and leg swelling.   Gastrointestinal: Positive for diarrhea, nausea and vomiting. Negative for abdominal pain and blood in stool.   Genitourinary: Negative.    Musculoskeletal: Negative.    Skin: Negative.    Neurological: Negative.  Negative for headaches.   All other systems reviewed and are negative.      Past Medical History:   Diagnosis Date   • Epilepsy (CMS/HCC)    • GERD (gastroesophageal reflux disease)    • Hypertension    • Kidney stone        Allergies   Allergen Reactions   • Amoxicillin Rash       Past Surgical History:   Procedure Laterality Date   • ANKLE SURGERY  2010   • APPENDECTOMY     • ELBOW PROCEDURE         Family History   Problem Relation Age of Onset   • Hypertension Mother    • Hypertension Father        Social History     Socioeconomic History   • Marital status: Single     Spouse name: Not on file   • Number of children: Not on file   • Years of education: Not on file   • Highest education level: Not on file    Tobacco Use   • Smoking status: Never Smoker   • Smokeless tobacco: Never Used   Substance and Sexual Activity   • Alcohol use: No   • Drug use: No   • Sexual activity: Defer       Current Facility-Administered Medications:   •  benzonatate (TESSALON) capsule 100 mg, 100 mg, Oral, Once, Joanna Peck PA-C  •  guaiFENesin (MUCINEX) 12 hr tablet 600 mg, 600 mg, Oral, Once, Joanna Peck PA-C  •  ondansetron (ZOFRAN) injection 4 mg, 4 mg, Intravenous, Once, Joanna Peck PA-C  •  sodium chloride 0.9 % bolus 1,000 mL, 1,000 mL, Intravenous, Once, Joanna Peck PA-C  •  Insert peripheral IV, , , Once **AND** sodium chloride 0.9 % flush 10 mL, 10 mL, Intravenous, PRN, Joanna Peck PA-C    Current Outpatient Medications:   •  losartan-hydrochlorothiazide (HYZAAR) 50-12.5 MG per tablet, Take 1 tablet by mouth Daily., Disp: , Rfl:   •  omeprazole (PriLOSEC) 20 MG capsule, TAKE 1 CAPSULE BY MOUTH EVERY DAY, Disp: , Rfl: 2  •  sertraline (ZOLOFT) 100 MG tablet, , Disp: , Rfl: 1  •  topiramate (TOPAMAX) 200 MG tablet, Take 200 mg by mouth 2 (Two) Times a Day., Disp: , Rfl:   •  carBAMazepine (TEGretol) 200 MG tablet, Take 200 mg by mouth 2 (Two) Times a Day., Disp: , Rfl:         Objective   Physical Exam  Vitals:    03/05/20 1850   BP: 133/87   Pulse: 86   Resp: 16   temp: 99.8, oxygen sat: 96% on RA    GENERAL: Alert and oriented ×4.  No apparent distress.  SKIN: Hot, pink and dry  HEENT: Atraumatic normocephalic  LUNGS: coarse bs bilat.  No accessory muscle use. No nasal flaring.   CARDIAC: Regular rate and rhythm.  S1 and S2.  No murmurs, rubs or gallops.  ABD: Soft, nontender.  Distended.  Bowel sounds are present and normoactive.  No guarding or rebound tenderness.  No CVA tenderness  M/S: MAEW, no deformity  PSYCH: Normal mood and affect      Procedures           ED Course  ED Course as of Mar 05 2013   u Mar 05, 2020   1904 Mucinex, tessalon, zofran, 1L NS given    [KY]   1944 Symptoms x 2  weeks. No tamiflu indicated   Influenza A Ag, EIA(!): Positive [[place]]   2003 Replaced. 20mEq KCl   Potassium(!): 3.3 [KY]   2007 Prednisone 60 mg po given    [KY]   2009 Pt states he wants to go home.  Offered admit, but he feels good enough.  Wbc nl. Lactate nl.  [facility] does NOT recommend abx. He will return if he gets worse.     Discussed pertinent labs and imaging findings with the patient/family.  Patient/Family voiced understanding of need to follow-up for recheck, further testing as needed.  Return to the emergency Department warnings were given.    D/c with tessalon, zofran, medrol dose pack, and albuterol (pt has used before).   F/u pmd Monday.    [KY]      ED Course User Index  [KY] Joanna Peck, TITI        Reviewed CXR. Independently viewed by me. Interpreted by radiologist. Discussed with pt.  Xr Chest 2 View    Result Date: 3/5/2020  Narrative: CR Chest 2 Vws INDICATION:  Coughing and chest congestion generalized weakness for 2 weeks and getting progressively worse on. History of hypertension. COMPARISON:  None. FINDINGS: PA and lateral views of the chest.  Cardiac silhouette size is normal. There is abnormal thickening of the central bronchovascular soft tissues with linear atelectasis in the mid to lower lungs and probably underlying distortion of parenchymal architecture consistent with underlying chronic obstructive lung disease. Patchy airspace disease bilateral inferior perihilar regions could be aspiration or early pneumonia or atelectasis related to mucus plugging in the setting of asthma or bronchitis. Clinical correlation and follow-up to resolution is recommended. No pneumothorax. No pleural effusion. No congestive failure.      Impression: Abnormal chest x-ray. Findings are most consistent with asthma or bronchitis superimposed upon underlying chronic lung disease. There is some patchy central airspace disease at the lower lungs which could be aspiration or early pneumonia or some  postobstructive atelectasis due to mucus plugging. Clinical correlation and follow-up is recommended. Signer Name: Sahara Bey MD  Signed: 3/5/2020 7:59 PM  Workstation Name: [id number]  [facility] states he understands and will f/u with pcp.    Results for orders placed or performed during the hospital encounter of 03/05/20   Influenza Antigen, Rapid - Swab, Nasopharynx   Result Value Ref Range    Influenza A Ag, EIA Positive (A) Negative    Influenza B Ag, EIA Negative Negative   Comprehensive Metabolic Panel   Result Value Ref Range    Glucose 99 65 - 99 mg/dL    BUN 19 6 - 20 mg/dL    Creatinine 0.87 0.76 - 1.27 mg/dL    Sodium 136 136 - 145 mmol/L    Potassium 3.3 (L) 3.5 - 5.2 mmol/L    Chloride 99 98 - 107 mmol/L    CO2 23.3 22.0 - 29.0 mmol/L    Calcium 8.8 8.6 - 10.5 mg/dL    Total Protein 8.1 6.0 - 8.5 g/dL    Albumin 3.90 3.50 - 5.20 g/dL    ALT (SGPT) 25 1 - 41 U/L    AST (SGOT) 38 1 - 40 U/L    Alkaline Phosphatase 87 39 - 117 U/L    Total Bilirubin 0.3 0.2 - 1.2 mg/dL    eGFR Non African Amer 90 >60 mL/min/1.73    Globulin 4.2 gm/dL    A/G Ratio 0.9 g/dL    BUN/Creatinine Ratio 21.8 7.0 - 25.0    Anion Gap 13.7 5.0 - 15.0 mmol/L   Lipase   Result Value Ref Range    Lipase 57 13 - 60 U/L   Magnesium   Result Value Ref Range    Magnesium 2.1 1.6 - 2.6 mg/dL   Procalcitonin   Result Value Ref Range    Procalcitonin 0.12 0.10 - 0.25 ng/mL   CBC Auto Differential   Result Value Ref Range    WBC 3.89 3.40 - 10.80 10*3/mm3    RBC 5.16 4.14 - 5.80 10*6/mm3    Hemoglobin 16.3 13.0 - 17.7 g/dL    Hematocrit 47.9 37.5 - 51.0 %    MCV 92.8 79.0 - 97.0 fL    MCH 31.6 26.6 - 33.0 pg    MCHC 34.0 31.5 - 35.7 g/dL    RDW 12.4 12.3 - 15.4 %    RDW-SD 42.7 37.0 - 54.0 fl    MPV 11.2 6.0 - 12.0 fL    Platelets 107 (L) 140 - 450 10*3/mm3    Neutrophil % 58.5 42.7 - 76.0 %    Lymphocyte % 30.1 19.6 - 45.3 %    Monocyte % 10.8 5.0 - 12.0 %    Eosinophil % 0.0 (L) 0.3 - 6.2 %    Basophil % 0.3  0.0 - 1.5 %    Immature Grans % 0.3 0.0 - 0.5 %    Neutrophils, Absolute 2.28 1.70 - 7.00 10*3/mm3    Lymphocytes, Absolute 1.17 0.70 - 3.10 10*3/mm3    Monocytes, Absolute 0.42 0.10 - 0.90 10*3/mm3    Eosinophils, Absolute 0.00 0.00 - 0.40 10*3/mm3    Basophils, Absolute 0.01 0.00 - 0.20 10*3/mm3    Immature Grans, Absolute 0.01 0.00 - 0.05 10*3/mm3    nRBC 0.0 0.0 - 0.2 /100 WBC   Scan Slide   Result Value Ref Range    RBC Morphology Normal Normal    WBC Morphology Normal Normal    Platelet Morphology Normal Normal                 MDM  Number of Diagnoses or Management Options  Hypokalemia: new and requires workup  Influenza A: new and requires workup     Amount and/or Complexity of Data Reviewed  Clinical lab tests: reviewed and ordered  Tests in the radiology section of CPT®: reviewed and ordered  Tests in the medicine section of CPT®: reviewed and ordered  Decide to obtain previous medical records or to obtain history from someone other than the patient: yes  Independent visualization of images, tracings, or specimens: yes    Risk of Complications, Morbidity, and/or Mortality  Presenting problems: moderate  Diagnostic procedures: moderate  Management options: moderate    Patient Progress  Patient progress: improved      Final diagnoses:   Influenza A   Hypokalemia     Dictated utilizing Dragon dictation         Joanna Peck PA-C  03/05/20 2013

## 2020-03-06 LAB
B PARAPERT DNA SPEC QL NAA+PROBE: NOT DETECTED
B PERT DNA SPEC QL NAA+PROBE: NOT DETECTED
C PNEUM DNA NPH QL NAA+NON-PROBE: NOT DETECTED
FLUAV H1 2009 PAND RNA NPH QL NAA+PROBE: DETECTED
FLUAV H1 HA GENE NPH QL NAA+PROBE: NOT DETECTED
FLUAV H3 RNA NPH QL NAA+PROBE: NOT DETECTED
FLUBV RNA ISLT QL NAA+PROBE: NOT DETECTED
HADV DNA SPEC NAA+PROBE: NOT DETECTED
HCOV 229E RNA SPEC QL NAA+PROBE: NOT DETECTED
HCOV HKU1 RNA SPEC QL NAA+PROBE: NOT DETECTED
HCOV NL63 RNA SPEC QL NAA+PROBE: NOT DETECTED
HCOV OC43 RNA SPEC QL NAA+PROBE: NOT DETECTED
HMPV RNA NPH QL NAA+NON-PROBE: NOT DETECTED
HPIV1 RNA SPEC QL NAA+PROBE: NOT DETECTED
HPIV2 RNA SPEC QL NAA+PROBE: NOT DETECTED
HPIV3 RNA NPH QL NAA+PROBE: NOT DETECTED
HPIV4 P GENE NPH QL NAA+PROBE: NOT DETECTED
M PNEUMO IGG SER IA-ACNC: NOT DETECTED
RHINOVIRUS RNA SPEC NAA+PROBE: NOT DETECTED
RSV RNA NPH QL NAA+NON-PROBE: NOT DETECTED

## 2020-03-06 NOTE — DISCHARGE INSTRUCTIONS
Return to the emergency department with worsening symptoms, uncontrolled pain, inability to tolerate oral liquids, fever greater than 101°F not controlled by Tylenol or as needed with emergent concerns.    Take mucinex OTC as directed, as needed for cough / congestion.

## 2020-03-06 NOTE — ED NOTES
"Patient states, \"I haven't been able to get in to see my doctor because they've been so busy. I've had this cough with N/V/D for a few weeks. I've only had diarrhea twice today\".    Will continue to assess. Bowel sounds present and normoactive. Patient complains of nausea. Medication orders received and administered.      Jackie Segundo, RN  03/05/20 1928    "

## 2023-03-30 ENCOUNTER — TRANSCRIBE ORDERS (OUTPATIENT)
Dept: ULTRASOUND IMAGING | Facility: HOSPITAL | Age: 63
End: 2023-03-30
Payer: COMMERCIAL

## 2023-03-30 DIAGNOSIS — N50.89 SCROTAL MASS: Primary | ICD-10-CM

## 2024-06-12 ENCOUNTER — APPOINTMENT (OUTPATIENT)
Dept: CT IMAGING | Facility: HOSPITAL | Age: 64
End: 2024-06-12
Payer: COMMERCIAL

## 2024-06-12 ENCOUNTER — HOSPITAL ENCOUNTER (EMERGENCY)
Facility: HOSPITAL | Age: 64
Discharge: HOME | End: 2024-06-12
Attending: EMERGENCY MEDICINE
Payer: COMMERCIAL

## 2024-06-12 VITALS
SYSTOLIC BLOOD PRESSURE: 131 MMHG | TEMPERATURE: 98 F | HEART RATE: 74 BPM | DIASTOLIC BLOOD PRESSURE: 79 MMHG | RESPIRATION RATE: 17 BRPM | HEIGHT: 69 IN | OXYGEN SATURATION: 97 % | BODY MASS INDEX: 31.1 KG/M2 | WEIGHT: 210 LBS

## 2024-06-12 DIAGNOSIS — N13.30 HYDROURETERONEPHROSIS: ICD-10-CM

## 2024-06-12 DIAGNOSIS — N20.1 CALCULUS OF URETEROVESICAL JUNCTION (UVJ): ICD-10-CM

## 2024-06-12 DIAGNOSIS — N20.0 BILATERAL NEPHROLITHIASIS: ICD-10-CM

## 2024-06-12 DIAGNOSIS — N23 RENAL COLIC: Primary | ICD-10-CM

## 2024-06-12 LAB
ALBUMIN SERPL-MCNC: 4.5 G/DL (ref 3.5–5.2)
ALBUMIN/GLOB SERPL: 1.6 G/DL
ALP SERPL-CCNC: 63 U/L (ref 39–117)
ALT SERPL W P-5'-P-CCNC: 10 U/L (ref 1–41)
AMORPH URATE CRY URNS QL MICRO: NORMAL /HPF
ANION GAP SERPL CALCULATED.3IONS-SCNC: 15.4 MMOL/L (ref 5–15)
AST SERPL-CCNC: 19 U/L (ref 1–40)
BACTERIA UR QL AUTO: NORMAL /HPF
BASOPHILS # BLD AUTO: 0.07 10*3/MM3 (ref 0–0.2)
BASOPHILS NFR BLD AUTO: 0.8 % (ref 0–1.5)
BILIRUB SERPL-MCNC: 0.3 MG/DL (ref 0–1.2)
BILIRUB UR QL STRIP: NEGATIVE
BUN SERPL-MCNC: 18 MG/DL (ref 8–23)
BUN/CREAT SERPL: 19.6 (ref 7–25)
CALCIUM SPEC-SCNC: 9.4 MG/DL (ref 8.6–10.5)
CHLORIDE SERPL-SCNC: 103 MMOL/L (ref 98–107)
CLARITY UR: ABNORMAL
CO2 SERPL-SCNC: 19.6 MMOL/L (ref 22–29)
COLOR UR: ABNORMAL
CREAT SERPL-MCNC: 0.92 MG/DL (ref 0.76–1.27)
DEPRECATED RDW RBC AUTO: 41.2 FL (ref 37–54)
EGFRCR SERPLBLD CKD-EPI 2021: 94.6 ML/MIN/1.73
EOSINOPHIL # BLD AUTO: 0.11 10*3/MM3 (ref 0–0.4)
EOSINOPHIL NFR BLD AUTO: 1.2 % (ref 0.3–6.2)
ERYTHROCYTE [DISTWIDTH] IN BLOOD BY AUTOMATED COUNT: 12.1 % (ref 12.3–15.4)
GLOBULIN UR ELPH-MCNC: 2.9 GM/DL
GLUCOSE SERPL-MCNC: 110 MG/DL (ref 65–99)
GLUCOSE UR STRIP-MCNC: NEGATIVE MG/DL
HCT VFR BLD AUTO: 40.7 % (ref 37.5–51)
HGB BLD-MCNC: 14.5 G/DL (ref 13–17.7)
HGB UR QL STRIP.AUTO: ABNORMAL
HOLD SPECIMEN: NORMAL
HOLD SPECIMEN: NORMAL
HYALINE CASTS UR QL AUTO: NORMAL /LPF
IMM GRANULOCYTES # BLD AUTO: 0.03 10*3/MM3 (ref 0–0.05)
IMM GRANULOCYTES NFR BLD AUTO: 0.3 % (ref 0–0.5)
KETONES UR QL STRIP: NEGATIVE
LEUKOCYTE ESTERASE UR QL STRIP.AUTO: NEGATIVE
LIPASE SERPL-CCNC: 29 U/L (ref 13–60)
LYMPHOCYTES # BLD AUTO: 1.65 10*3/MM3 (ref 0.7–3.1)
LYMPHOCYTES NFR BLD AUTO: 18.5 % (ref 19.6–45.3)
MAGNESIUM SERPL-MCNC: 2.2 MG/DL (ref 1.6–2.4)
MCH RBC QN AUTO: 33 PG (ref 26.6–33)
MCHC RBC AUTO-ENTMCNC: 35.6 G/DL (ref 31.5–35.7)
MCV RBC AUTO: 92.7 FL (ref 79–97)
MONOCYTES # BLD AUTO: 0.85 10*3/MM3 (ref 0.1–0.9)
MONOCYTES NFR BLD AUTO: 9.5 % (ref 5–12)
NEUTROPHILS NFR BLD AUTO: 6.2 10*3/MM3 (ref 1.7–7)
NEUTROPHILS NFR BLD AUTO: 69.7 % (ref 42.7–76)
NITRITE UR QL STRIP: POSITIVE
NRBC BLD AUTO-RTO: 0 /100 WBC (ref 0–0.2)
PH UR STRIP.AUTO: 7.5 [PH] (ref 4.5–8)
PLATELET # BLD AUTO: 179 10*3/MM3 (ref 140–450)
PMV BLD AUTO: 11 FL (ref 6–12)
POTASSIUM SERPL-SCNC: 3 MMOL/L (ref 3.5–5.2)
PROT SERPL-MCNC: 7.4 G/DL (ref 6–8.5)
PROT UR QL STRIP: NEGATIVE
RBC # BLD AUTO: 4.39 10*6/MM3 (ref 4.14–5.8)
RBC # UR STRIP: NORMAL /HPF
REF LAB TEST METHOD: NORMAL
SODIUM SERPL-SCNC: 138 MMOL/L (ref 136–145)
SP GR UR STRIP: 1.01 (ref 1–1.03)
SQUAMOUS #/AREA URNS HPF: NORMAL /HPF
UROBILINOGEN UR QL STRIP: ABNORMAL
WBC # UR STRIP: NORMAL /HPF
WBC NRBC COR # BLD AUTO: 8.91 10*3/MM3 (ref 3.4–10.8)
WHOLE BLOOD HOLD COAG: NORMAL
WHOLE BLOOD HOLD SPECIMEN: NORMAL

## 2024-06-12 PROCEDURE — 80053 COMPREHEN METABOLIC PANEL: CPT | Performed by: EMERGENCY MEDICINE

## 2024-06-12 PROCEDURE — 83735 ASSAY OF MAGNESIUM: CPT | Performed by: NURSE PRACTITIONER

## 2024-06-12 PROCEDURE — 25010000002 KETOROLAC TROMETHAMINE PER 15 MG: Performed by: NURSE PRACTITIONER

## 2024-06-12 PROCEDURE — 85025 COMPLETE CBC W/AUTO DIFF WBC: CPT

## 2024-06-12 PROCEDURE — 83690 ASSAY OF LIPASE: CPT | Performed by: EMERGENCY MEDICINE

## 2024-06-12 PROCEDURE — 99284 EMERGENCY DEPT VISIT MOD MDM: CPT

## 2024-06-12 PROCEDURE — 74176 CT ABD & PELVIS W/O CONTRAST: CPT

## 2024-06-12 PROCEDURE — 96375 TX/PRO/DX INJ NEW DRUG ADDON: CPT

## 2024-06-12 PROCEDURE — 96374 THER/PROPH/DIAG INJ IV PUSH: CPT

## 2024-06-12 PROCEDURE — 81001 URINALYSIS AUTO W/SCOPE: CPT | Performed by: EMERGENCY MEDICINE

## 2024-06-12 PROCEDURE — 25010000002 ONDANSETRON PER 1 MG: Performed by: NURSE PRACTITIONER

## 2024-06-12 RX ORDER — POTASSIUM CHLORIDE 20 MEQ/1
40 TABLET, EXTENDED RELEASE ORAL ONCE
Status: COMPLETED | OUTPATIENT
Start: 2024-06-12 | End: 2024-06-12

## 2024-06-12 RX ORDER — TAMSULOSIN HYDROCHLORIDE 0.4 MG/1
0.4 CAPSULE ORAL ONCE
Status: COMPLETED | OUTPATIENT
Start: 2024-06-12 | End: 2024-06-12

## 2024-06-12 RX ORDER — NALOXONE HYDROCHLORIDE 4 MG/.1ML
SPRAY NASAL
Qty: 2 EACH | Refills: 0 | Status: SHIPPED | OUTPATIENT
Start: 2024-06-12

## 2024-06-12 RX ORDER — CYCLOBENZAPRINE HCL 10 MG
10 TABLET ORAL 3 TIMES DAILY PRN
COMMUNITY

## 2024-06-12 RX ORDER — TAMSULOSIN HYDROCHLORIDE 0.4 MG/1
1 CAPSULE ORAL DAILY
Qty: 12 CAPSULE | Refills: 0 | Status: SHIPPED | OUTPATIENT
Start: 2024-06-12

## 2024-06-12 RX ORDER — SODIUM CHLORIDE 0.9 % (FLUSH) 0.9 %
10 SYRINGE (ML) INJECTION AS NEEDED
Status: DISCONTINUED | OUTPATIENT
Start: 2024-06-12 | End: 2024-06-12 | Stop reason: HOSPADM

## 2024-06-12 RX ORDER — CEFIXIME 400 MG/1
400 CAPSULE ORAL DAILY
Qty: 10 CAPSULE | Refills: 0 | Status: SHIPPED | OUTPATIENT
Start: 2024-06-12

## 2024-06-12 RX ORDER — ONDANSETRON 4 MG/1
4 TABLET, ORALLY DISINTEGRATING ORAL EVERY 4 HOURS PRN
Qty: 15 TABLET | Refills: 0 | Status: SHIPPED | OUTPATIENT
Start: 2024-06-12

## 2024-06-12 RX ORDER — HYDROCODONE BITARTRATE AND ACETAMINOPHEN 7.5; 325 MG/1; MG/1
1 TABLET ORAL EVERY 8 HOURS PRN
Qty: 9 TABLET | Refills: 0 | Status: SHIPPED | OUTPATIENT
Start: 2024-06-12

## 2024-06-12 RX ORDER — KETOROLAC TROMETHAMINE 30 MG/ML
30 INJECTION, SOLUTION INTRAMUSCULAR; INTRAVENOUS ONCE
Status: COMPLETED | OUTPATIENT
Start: 2024-06-12 | End: 2024-06-12

## 2024-06-12 RX ORDER — ONDANSETRON 2 MG/ML
8 INJECTION INTRAMUSCULAR; INTRAVENOUS ONCE
Status: COMPLETED | OUTPATIENT
Start: 2024-06-12 | End: 2024-06-12

## 2024-06-12 RX ORDER — HYDROCODONE BITARTRATE AND ACETAMINOPHEN 7.5; 325 MG/1; MG/1
1 TABLET ORAL EVERY 12 HOURS PRN
COMMUNITY
End: 2024-06-12

## 2024-06-12 RX ORDER — KETOROLAC TROMETHAMINE 10 MG/1
10 TABLET, FILM COATED ORAL EVERY 6 HOURS PRN
Qty: 20 TABLET | Refills: 0 | Status: SHIPPED | OUTPATIENT
Start: 2024-06-12

## 2024-06-12 RX ADMIN — POTASSIUM CHLORIDE 40 MEQ: 1500 TABLET, EXTENDED RELEASE ORAL at 16:50

## 2024-06-12 RX ADMIN — ONDANSETRON 8 MG: 2 INJECTION INTRAMUSCULAR; INTRAVENOUS at 15:36

## 2024-06-12 RX ADMIN — KETOROLAC TROMETHAMINE 30 MG: 30 INJECTION, SOLUTION INTRAMUSCULAR; INTRAVENOUS at 15:36

## 2024-06-12 RX ADMIN — TAMSULOSIN HYDROCHLORIDE 0.4 MG: 0.4 CAPSULE ORAL at 16:50

## 2024-06-12 NOTE — ED PROVIDER NOTES
Subjective   History of Present Illness  63-year-old  overweight male patient presented to the emergency department via EMS with a chief complaint of sudden onset of right flank/right lower quadrant abdominal pain that radiates into his penis and testicles.  He stated this started early this morning and it has progressively gotten worse.  Patient states the pain is similar to previous episodes of kidney stones.  He states in the past he has had 5 episodes of kidney stones.  He states each time he is able to pass the stone but today he is in severe pain.  Patient was brought to the emergency department for further evaluation and treatment.    History provided by:  Medical records, patient and EMS personnel      Review of Systems   Constitutional: Negative.    HENT: Negative.     Respiratory: Negative.     Cardiovascular: Negative.    Gastrointestinal:  Positive for abdominal pain and nausea. Negative for abdominal distention, anal bleeding, blood in stool, constipation, diarrhea, rectal pain and vomiting.   Genitourinary:  Positive for decreased urine volume, difficulty urinating, flank pain, penile pain, scrotal swelling and testicular pain. Negative for dysuria, enuresis, frequency, genital sores, hematuria, penile discharge, penile swelling and urgency.   Skin: Negative.    Neurological: Negative.    Hematological: Negative.    Psychiatric/Behavioral: Negative.     All other systems reviewed and are negative.      Past Medical History:   Diagnosis Date    Epilepsy     GERD (gastroesophageal reflux disease)     Hypertension     Kidney stone        Allergies   Allergen Reactions    Amoxicillin Rash       Past Surgical History:   Procedure Laterality Date    ANKLE SURGERY  2010    APPENDECTOMY      ELBOW PROCEDURE         Family History   Problem Relation Age of Onset    Hypertension Mother     Hypertension Father        Social History     Socioeconomic History    Marital status: Single   Tobacco Use     Smoking status: Never    Smokeless tobacco: Never   Substance and Sexual Activity    Alcohol use: No    Drug use: No    Sexual activity: Defer           Objective   Physical Exam  Vitals and nursing note reviewed.   Constitutional:       General: He is awake. He is not in acute distress.     Appearance: He is overweight. He is ill-appearing. He is not toxic-appearing or diaphoretic.   HENT:      Head: Normocephalic and atraumatic.      Mouth/Throat:      Mouth: Mucous membranes are moist.      Pharynx: Oropharynx is clear.   Eyes:      Extraocular Movements: Extraocular movements intact.      Pupils: Pupils are equal, round, and reactive to light.   Cardiovascular:      Rate and Rhythm: Normal rate and regular rhythm.      Heart sounds: Normal heart sounds.   Pulmonary:      Effort: Pulmonary effort is normal.      Breath sounds: Normal breath sounds.   Abdominal:      General: Abdomen is scaphoid. Bowel sounds are normal. There is no distension or abdominal bruit. There are no signs of injury.      Palpations: Abdomen is soft.      Tenderness: There is no abdominal tenderness in the suprapubic area.      Hernia: A hernia is present. Hernia is present in the right inguinal area.   Genitourinary:     Testes:         Right: Tenderness present.   Skin:     General: Skin is warm and dry.      Capillary Refill: Capillary refill takes less than 2 seconds.   Neurological:      General: No focal deficit present.      Mental Status: He is alert and oriented to person, place, and time.   Psychiatric:         Mood and Affect: Mood is anxious.         Behavior: Behavior normal. Behavior is cooperative.         Procedures           ED Course  ED Course as of 06/12/24 1725   Wed Jun 12, 2024   1623 CT Abdomen Pelvis Without Contrast  First urology paged to discuss CT findings.  Patient has an obstructing 6 mm stone at the right UVJ with mild hydroureteronephrosis.  Discussed this with the patient as well.  Patient states that he  would like to do medication and follow-up in the office if at all possible.  I will discuss this with urology. [RC]   2674 Urology re-paged to discuss patient. [RC]      ED Course User Index  [RC] Monty Dalal APRN                                             Medical Decision Making  Differential diagnosis includes ureterolithiasis, kidney stone, pyelonephritis, acute cystitis, inguinal hernia, testicular torsion, and bladder cancer.    CT Abdomen Pelvis Without Contrast    Result Date: 6/12/2024  Impression: 1.6 mm obstructing calculus at the right ureterovesical junction causes mild right hydroureteronephrosis. 2.Additional bilateral nonobstructive nephrolithiasis. 3.Colonic diverticulosis. 4.Right-sided fat-containing inguinal hernia. 5.Additional findings as detailed above. Electronically Signed: Te Fuchs MD  6/12/2024 3:38 PM EDT  Workstation ID: [id number]     Results for orders placed or performed during the hospital encounter of 06/12/24  -Gold Top - SST:        Result                      Value             Ref Range           Extra Tube                                                    Hold for add-ons.  -Green Top (Gel):        Result                      Value             Ref Range           Extra Tube                                                    Hold for add-ons.  -Urinalysis, Microscopic Only - Urine, Clean Catch:   Specimen: Urine, Clean Catch       Result                      Value             Ref Range           RBC, UA                     0-2               None Seen, 0*       WBC, UA                     None Seen         None Seen, 0*       Bacteria, UA                None Seen         None Seen /H*       Squamous Epithelial Ce*     None Seen         None Seen, 0*       Hyaline Casts, UA           None Seen         None Seen /L*       Amorphous Crystals, UA      Large/3+          None Seen /H*       Methodology                                                   Manual Light  Microscopy  -Urinalysis With Microscopic If Indicated (No Culture) - Urine, Clean Catch:   Specimen: Urine, Clean Catch       Result                      Value             Ref Range           Color, UA                   Other (A)         Yellow, Straw       Appearance, UA                                Clear           Slightly Cloudy (A)       pH, UA                      7.5               4.5 - 8.0           Specific [place], UA        1.015             1.003 - 1.030       Glucose, UA                 Negative          Negative            Ketones, UA                 Negative          Negative            Bilirubin, UA               Negative          Negative            Blood, UA                                     Negative        Small (1+) (A)       Protein, UA                 Negative          Negative            Leuk Esterase, UA           Negative          Negative            Nitrite, UA                 Positive (A)      Negative            Urobilinogen, UA            0.2 E.U./dL       0.2 - 1.0 E.*  -CBC Auto Differential:   Specimen: Arm, Left; Blood       Result                      Value             Ref Range           WBC                         8.91              3.40 - 10.80*       RBC                         4.39              4.14 - 5.80 *       Hemoglobin                  14.5              13.0 - 17.7 *       Hematocrit                  40.7              37.5 - 51.0 %       MCV                         92.7              79.0 - 97.0 *       MCH                         33.0              26.6 - 33.0 *       MCHC                        35.6              31.5 - 35.7 *       RDW                         12.1 (L)          12.3 - 15.4 %       RDW-SD                      41.2              37.0 - 54.0 *       MPV                         11.0              6.0 - 12.0 fL       Platelets                   179               140 - 450 10*       Neutrophil %                69.7              42.7 - 76.0 %       Lymphocyte %                 18.5 (L)          19.6 - 45.3 %       Monocyte %                  9.5               5.0 - 12.0 %        Eosinophil %                1.2               0.3 - 6.2 %         Basophil %                  0.8               0.0 - 1.5 %         Immature Grans %            0.3               0.0 - 0.5 %         Neutrophils, Absolute       6.20              1.70 - 7.00 *       Lymphocytes, Absolute       1.65              0.70 - 3.10 *       Monocytes, Absolute         0.85              0.10 - 0.90 *       Eosinophils, Absolute       0.11              0.00 - 0.40 *       Basophils, Absolute         0.07              0.00 - 0.20 *       Immature Grans, Absolu*     0.03              0.00 - 0.05 *       nRBC                        0.0               0.0 - 0.2 /1*  -Lavender Top:        Result                      Value             Ref Range           Extra Tube                                                    hold for add-on  -Light Blue Top:        Result                      Value             Ref Range           Extra Tube                                                    Hold for add-ons.  -Magnesium:   Specimen: Arm, Left; Blood       Result                      Value             Ref Range           Magnesium                   2.2               1.6 - 2.4 mg*  -Lipase:   Specimen: Arm, Left; Blood       Result                      Value             Ref Range           Lipase                      29                13 - 60 U/L    -Comprehensive Metabolic Panel:   Specimen: Arm, Left; Blood       Result                      Value             Ref Range           Glucose                     110 (H)           65 - 99 mg/dL       BUN                         18                8 - 23 mg/dL        Creatinine                  0.92              0.76 - 1.27 *       Sodium                      138               136 - 145 mm*       Potassium                   3.0 (L)           3.5 - 5.2 mm*       Chloride                    103                98 - 107 mmo*       CO2                         19.6 (L)          22.0 - 29.0 *       Calcium                     9.4               8.6 - 10.5 m*       Total Protein               7.4               6.0 - 8.5 g/*       Albumin                     4.5               3.5 - 5.2 g/*       ALT (SGPT)                  10                1 - 41 U/L          AST (SGOT)                  19                1 - 40 U/L          Alkaline Phosphatase        63                39 - 117 U/L        Total Bilirubin             0.3               0.0 - 1.2 mg*       Globulin                    2.9               gm/dL               A/G Ratio                   1.6               g/dL                BUN/Creatinine Ratio        19.6              7.0 - 25.0          Anion Gap                   15.4 (H)          5.0 - 15.0 m*       eGFR                        94.6              >60.0 mL/min*  Results for orders placed or performed during the hospital encounter of 03/05/20  -Scan Slide:   Specimen: Blood       Result                      Value             Ref Range           RBC Morphology              Normal            Normal              WBC Morphology              Normal            Normal              Platelet Morphology         Normal            Normal         -Respiratory Panel, PCR - Swab, Nasopharynx:   Specimen: Nasopharynx; Swab       Result                      Value             Ref Range           ADENOVIRUS, PCR             Not Detected      Not Detected        Coronavirus 229E            Not Detected      Not Detected        Coronavirus HKU1            Not Detected      Not Detected        Coronavirus NL63            Not Detected      Not Detected        Coronavirus OC43            Not Detected      Not Detected        Human Metapneumovirus       Not Detected      Not Detected        Human Rhinovirus/Enter*     Not Detected      Not Detected        Influenza B PCR             Not Detected      Not Detected         Parainfluenza Virus 1       Not Detected      Not Detected        Parainfluenza Virus 2       Not Detected      Not Detected        Parainfluenza Virus 3       Not Detected      Not Detected        Parainfluenza Virus 4       Not Detected      Not Detected        Bordetella pertussis p*     Not Detected      Not Detected        Influenza A H1 2009 PCR     Detected (A)      Not Detected        Chlamydophila pneumoni*     Not Detected      Not Detected        Mycoplasma pneumo by P*     Not Detected      Not Detected        Influenza A H3              Not Detected      Not Detected        Influenza A H1              Not Detected      Not Detected        RSV, PCR                    Not Detected      Not Detected        Bordetella parapertuss*     Not Detected      Not Detected   -Procalcitonin:   Specimen: Blood       Result                      Value             Ref Range           Procalcitonin               0.12              0.10 - 0.25 *  -CBC Auto Differential:   Specimen: Blood       Result                      Value             Ref Range           WBC                         3.89              3.40 - 10.80*       RBC                         5.16              4.14 - 5.80 *       Hemoglobin                  16.3              13.0 - 17.7 *       Hematocrit                  47.9              37.5 - 51.0 %       MCV                         92.8              79.0 - 97.0 *       MCH                         31.6              26.6 - 33.0 *       MCHC                        34.0              31.5 - 35.7 *       RDW                         12.4              12.3 - 15.4 %       RDW-SD                      42.7              37.0 - 54.0 *       MPV                         11.2              6.0 - 12.0 fL       Platelets                   107 (L)           140 - 450 10*       Neutrophil %                58.5              42.7 - 76.0 %       Lymphocyte %                30.1              19.6 - 45.3 %       Monocyte %                   10.8              5.0 - 12.0 %        Eosinophil %                0.0 (L)           0.3 - 6.2 %         Basophil %                  0.3               0.0 - 1.5 %         Immature Grans %            0.3               0.0 - 0.5 %         Neutrophils, Absolute       2.28              1.70 - 7.00 *       Lymphocytes, Absolute       1.17              0.70 - 3.10 *       Monocytes, Absolute         0.42              0.10 - 0.90 *       Eosinophils, Absolute       0.00              0.00 - 0.40 *       Basophils, Absolute         0.01              0.00 - 0.20 *       Immature Grans, Absolu*     0.01              0.00 - 0.05 *       nRBC                        0.0               0.0 - 0.2 /1*    *Note: Due to a large number of results and/or encounters for the requested time period, some results have not been displayed. A complete set of results can be found in Results Review.     Discussed imaging, laboratory findings and assessment with patient.  Patient has a 6 mm obstructing stone in the right UVJ with mild hydroureteronephrosis.  Called and discussed with urology.  Dr. Kam Hdz was on-call.  He states that the patient may be able to pass it with medications at home.  We will have him follow-up with Dr. Hdz next week.  Patient will be given prescriptions for Toradol, Flomax, Norco, and Suprax.  Patient should increase fluids and rest.  Patient should return to the emergency department if symptoms get worse or change.  Patient understands and agrees to discharge plan.    Problems Addressed:  Bilateral nephrolithiasis: complicated acute illness or injury  Calculus of ureterovesical junction (UVJ): complicated acute illness or injury  Hydroureteronephrosis: complicated acute illness or injury  Renal colic: complicated acute illness or injury    Amount and/or Complexity of Data Reviewed  Labs: ordered. Decision-making details documented in ED Course.  Radiology: ordered. Decision-making details documented in ED  Course.    Risk  Prescription drug management.        Final diagnoses:   Renal colic   Calculus of ureterovesical junction (UVJ)   Bilateral nephrolithiasis   Hydroureteronephrosis       ED Disposition  ED Disposition       ED Disposition   Discharge    Condition   Stable    Comment   --               Maycol Hdz MD  [address] [place] [address] [phone]    Call in 1 day  Call in the morning to schedule an appointment         Medication List        New Prescriptions      Cefixime 400 MG capsule  Commonly known as: SUPRAX  Take 1 capsule by mouth Daily.     ketorolac 10 MG tablet  Commonly known as: TORADOL  Take 1 tablet by mouth Every 6 (Six) Hours As Needed for Mild Pain or Moderate Pain.     naloxone 4 MG/0.1ML nasal spray  Commonly known as: NARCAN  Call 911. Don't prime. [place] in 1 nostril for overdose. Repeat in 2-3 minutes in other nostril if no or minimal breathing/responsiveness.     tamsulosin 0.4 MG capsule 24 hr capsule  Commonly known as: Flomax  Take 1 capsule by mouth Daily. Discontinue if stone passes or lightheaded when upright.            Changed      HYDROcodone-acetaminophen 7.5-325 MG per tablet  Commonly known as: NORCO  Take 1 tablet by mouth Every 8 (Eight) Hours As Needed for Moderate Pain or Severe Pain (pain).  What changed:   when to take this  reasons to take this     ondansetron ODT 4 MG disintegrating tablet  Commonly known as: ZOFRAN-ODT  Take 1 tablet by mouth Every 4 (Four) Hours As Needed for Nausea for up to 15 doses.  What changed:   when to take this  reasons to take this               Where to Get Your Medications        These medications were sent to [facility]/pharmacy #[id number] - [place], [place] - [address] [facility] - [phone]  - [phone]   [address] [facility] [address]      Phone: [phone]   Cefixime 400 MG capsule  HYDROcodone-acetaminophen 7.5-325 MG per tablet  ketorolac 10 MG tablet  naloxone 4 MG/0.1ML nasal  spray  ondansetron ODT 4 MG disintegrating tablet  tamsulosin 0.4 MG capsule 24 hr capsule            Monty Dalal, APRN  06/12/24 [id number]

## 2024-07-15 ENCOUNTER — TRANSCRIBE ORDERS (OUTPATIENT)
Dept: ADMINISTRATIVE | Facility: HOSPITAL | Age: 64
End: 2024-07-15
Payer: COMMERCIAL

## 2024-07-15 DIAGNOSIS — M54.51 VERTEBROGENIC LOW BACK PAIN: Primary | ICD-10-CM
